# Patient Record
Sex: FEMALE | Race: WHITE | Employment: FULL TIME | URBAN - METROPOLITAN AREA
[De-identification: names, ages, dates, MRNs, and addresses within clinical notes are randomized per-mention and may not be internally consistent; named-entity substitution may affect disease eponyms.]

---
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
## 2023-02-16 ENCOUNTER — HOSPITAL ENCOUNTER (OUTPATIENT)
Dept: ULTRASOUND IMAGING | Age: 23
Discharge: HOME | End: 2023-02-16

## 2023-02-16 VITALS
HEIGHT: 61 IN | TEMPERATURE: 97.6 F | OXYGEN SATURATION: 98 % | DIASTOLIC BLOOD PRESSURE: 59 MMHG | HEART RATE: 74 BPM | BODY MASS INDEX: 49.65 KG/M2 | SYSTOLIC BLOOD PRESSURE: 114 MMHG | WEIGHT: 263 LBS | RESPIRATION RATE: 16 BRPM

## 2023-02-16 DIAGNOSIS — Z36.89 ENCOUNTER FOR FETAL ANATOMIC SURVEY: ICD-10-CM

## 2023-02-16 PROCEDURE — 76811 OB US DETAILED SNGL FETUS: CPT

## 2023-02-16 PROCEDURE — 99211 OFF/OP EST MAY X REQ PHY/QHP: CPT

## 2023-02-16 NOTE — PROCEDURES
[facility]/Women's Imaging Ultrasound Report    Patient name: Pam Canales Age: 25 y.o. : 2000     Date of Service: [id number]    BRIGIDA: Estimated Date of Delivery:2023 Gestational Age: 29  OB Hx: G1    Indication: Anatomy for BMI 49    Scan type: Anatomy/Level 2    Fetus #: 1     Presentation: Breech  Fetal Heart Rate: 165bpm   Placental location: Posterior  Amniotic fluid: Normal   P. Cord Insertion: Non-Visualized  Single Deepest Pocket: 5.81cm    Biometry:  Biparietal Diameter    78.5mm 31w4d  14%  Head Circumference   111.5mm 33w3d  32%  Abdominal Circumference  310.8mm 35w0d  >97%  Femur Length    61.4mm 31w6d  20%  Humerus Length   59.0mm 34w1d  Cerebellum    40.6mm 32w5d  Cisterna Magna   7.8mm  Nuchal Fold Thickness  N/A  Lateral Ventricle   2.2mm  Ocular Diameter (OOD)          40.6mm 32w5d  Ulna     56.2mm 35w2d  Radius     51.4mm  Tibia     55.9mm 32w6d  Fibula      55.6mm 36w4d  94%  Foot     Not performed    Fetal weight:    2244g  74%    Anatomy:      Head  Cranium:  Visualized  Intracranial Lena: Visualized  Fetal Cavum:  Visualized  Lateral Ventricles:  Visualized  Choroid Plexus:  Non-Visualized  Cerebellum:   Limited  Midline Falx:   Visualized  PMT/AR:   Non-Visualized  Midface:   Visualized  Profile:   Visualized  Orbits/Eyes:  Lenses non-visualized      Thorax  4 Chamber Heart: Limited  RVOT:   Non-Visualized  LVOT:    Visualized  3VV:    Visualized  3VTV:    Non-Visualized  Aortic Arch:    Non-Visualized  Ductal Arch:    Non-Visualized  SVC/IVC:    Non-Visualized  Crossing:  Non-Visualized  Cardiac Rhythm: Normal  Cardiac Axis:   Normal  Cardiac Situs:   Normal        Spine  Cervical  Visualized  Thoracic  Visualized  Lumbar  Visualized  Sacral   Visualized        Abdomen  Diaphragm  Visualized  Stomach   Visualized  Bowel   Visualized  Bladder   Visualized  Abd. Cord Insertion  Visualized  Ventral Wall  Visualized  Rt. Kidney  Visualized  Lt. Kidney  Visualized  Renal Arteries  Visualized  Umbilical Cord  3VC        Extremities  Humerus - Left Visualized  Humerus - Right Non-Visualized  Forearm - Left             Visualized  Forearm - Right  Non-Visualized  Hand - Left  Closed  Hand - Right  Closed  Femur - Left  Visualized  Femur - Right  Limited  Lower Leg - Left Visualized  Lower Leg - Right Limited  Feet   Limited    Genitalia:    Female          Other  BPP is    Not performed  UA Doppler   Not performed  MCA Doppler   Not performed        Thank you for sending your patient for an detailed anatomy scan. She is seen for maternal obesity. Biometry and fluid are reassuring. Anatomy is unremarkable but very limited. The limitations of US in the detection of fetal abnormalities and aneuploidy were discussed. Today's exam was limited by maternal habitus, fetal position and late gestational age.  US is not meant to take the place of the  exam after delivery. No f/u scheduled with SHANA.      MD SHANA Strickland

## 2023-02-16 NOTE — FLOWSHEET NOTE
Pt arrives to unit for scheduled US with MFM. Pt shown to LT-06. VS obtained. History and medications reviewed. Pt states feeling good fetal movement. Denies vaginal bleeding, leaking of fluid, and abdominal tenderness. Pt has no complaints at this time.

## 2023-03-06 ENCOUNTER — HOSPITAL ENCOUNTER (OUTPATIENT)
Age: 23
Discharge: HOME | End: 2023-03-06
Attending: OBSTETRICS & GYNECOLOGY | Admitting: OBSTETRICS & GYNECOLOGY
Payer: MEDICAID

## 2023-03-06 VITALS
HEIGHT: 61 IN | HEART RATE: 74 BPM | OXYGEN SATURATION: 98 % | TEMPERATURE: 97.9 F | DIASTOLIC BLOOD PRESSURE: 66 MMHG | BODY MASS INDEX: 51.54 KG/M2 | SYSTOLIC BLOOD PRESSURE: 124 MMHG | WEIGHT: 273 LBS | RESPIRATION RATE: 18 BRPM

## 2023-03-06 PROCEDURE — 59025 FETAL NON-STRESS TEST: CPT

## 2023-03-06 RX ORDER — ONDANSETRON 2 MG/ML
4 INJECTION INTRAMUSCULAR; INTRAVENOUS EVERY 6 HOURS PRN
Status: DISCONTINUED | OUTPATIENT
Start: 2023-03-06 | End: 2023-03-06 | Stop reason: HOSPADM

## 2023-03-06 RX ORDER — ACETAMINOPHEN 325 MG/1
650 TABLET ORAL EVERY 4 HOURS PRN
Status: DISCONTINUED | OUTPATIENT
Start: 2023-03-06 | End: 2023-03-06 | Stop reason: HOSPADM

## 2023-03-06 RX ORDER — ONDANSETRON 4 MG/1
4 TABLET, ORALLY DISINTEGRATING ORAL EVERY 8 HOURS PRN
Status: DISCONTINUED | OUTPATIENT
Start: 2023-03-06 | End: 2023-03-06 | Stop reason: HOSPADM

## 2023-03-06 NOTE — DISCHARGE INSTRUCTIONS
LABOR    Call your doctor if you have these signs:     Regular tightening of the uterus coming as often as once every 15 minutes     Menstrual-like cramps, they may be regular, or may be continuous and move to   your back. A low, dull backache different from what you normally experience. It may come and go. Vaginal leaking of fluid. Any bleeding from your vagina. Pain, burning or bleeding when you urinate. LABOR    Call your doctor, or come to the hospital, if you have:    Contractions coming about every 3-5 minutes apart, for about one hour. Labor contractions are usually strong enough that you can not walk or talk while having the contractions. ( Contractions can feel like menstrual cramps, tightening in your abdomen, rectal pressure or a backache. This feeling comes and goes.)    Vaginal leaking of fluid. Heavy, bright red bleeding, like the days of your period. ( A little bloody show or spotting is normal in labor.)    ALWAYS CALL YOUR DOCTOR IF YOU:    Notice decreased movement of your baby, different from what you are used to. Have heavy, bright red bleeding, like the heavy days of your periods. Have vaginal leaking of fluid.     Keep your next appointment. ______As scheduled_______________________________________    Activity ___As tolerated______________      Diet___As tolerated_________________

## 2023-03-06 NOTE — PROGRESS NOTES
Department of Obstetrics and Gynecology  Labor and Delivery  TRIAGE NOTE      SUBJECTIVE:    Chief Complaint   Patient presents with    Non-stress Test     Pt reports to triage for NST. Pt states that she was told to come in last week but did not have a ride. +FM. Pt denies VB, LOF. Pt states that baby is breech and she gets U/S every Thursday. OBJECTIVE    Vitals:  /64   Pulse 75   Temp 97.9 °F (36.6 °C) (Oral)   Resp 18   Ht 5' 1\" (1.549 m)   Wt 273 lb (123.8 kg)   SpO2 96%   BMI 51.58 kg/m²       CONSTITUTIONAL:  negative  RESPIRATORY:  negative  CARDIOVASCULAR:  negative  GASTROINTESTINAL:  negative  ALLERGIC/IMMUNOLOGIC:  negative  NEUROLOGICAL:  negative  BEHAVIOR/PSYCH:  negative    Cervix:  deferred    Fetal Position:    Breech    Fetal heart rate:        Baseline FHR :    130 bpm              Fetal Accelerations:  present        Fetal Decelerations:  absent        Fetal Variability:   moderate    Contraction frequency: quiet     DATA:  No results found for: WBC, HGB, HCT, MCV, PLT      ASSESSMENT:   NST for fetal well-being, third trimester      PLAN: Obtain reactive NST.         ALICIA Simpson - SEAMUS

## 2023-03-06 NOTE — FLOWSHEET NOTE
Discharge instructions given and signed voiced understanding. Left ambulatory from the [facility] in stable condition with friend.

## 2023-03-06 NOTE — FLOWSHEET NOTE
Pt states she talked to the office last week and was told to come to L/D for NST, pt unsure why she needs NST. Pt states she does have weekly U/S. Baby is breech. Pt c/o headache since Saturday off/on, behind eyes and back of head off / on, has not taken any tylenol and does not want any Tylenol. Pt denies any visual change or seeing spots or abdominal tenderness when touched. Pt denies any vaginal bleeding, denies LOF.

## 2023-03-30 ENCOUNTER — HOSPITAL ENCOUNTER (OUTPATIENT)
Age: 23
Discharge: HOME | End: 2023-03-30
Attending: OBSTETRICS & GYNECOLOGY | Admitting: OBSTETRICS & GYNECOLOGY
Payer: MEDICAID

## 2023-03-30 VITALS
WEIGHT: 284 LBS | RESPIRATION RATE: 16 BRPM | OXYGEN SATURATION: 97 % | TEMPERATURE: 97.8 F | HEIGHT: 61 IN | HEART RATE: 71 BPM | SYSTOLIC BLOOD PRESSURE: 116 MMHG | DIASTOLIC BLOOD PRESSURE: 56 MMHG | BODY MASS INDEX: 53.62 KG/M2

## 2023-03-30 PROCEDURE — 59025 FETAL NON-STRESS TEST: CPT

## 2023-03-30 PROCEDURE — 99213 OFFICE O/P EST LOW 20 MIN: CPT | Performed by: ADVANCED PRACTICE MIDWIFE

## 2023-03-30 RX ORDER — ACETAMINOPHEN 325 MG/1
650 TABLET ORAL EVERY 4 HOURS PRN
Status: DISCONTINUED | OUTPATIENT
Start: 2023-03-30 | End: 2023-03-30 | Stop reason: HOSPADM

## 2023-03-30 RX ORDER — ONDANSETRON 4 MG/1
4 TABLET, ORALLY DISINTEGRATING ORAL EVERY 8 HOURS PRN
Status: DISCONTINUED | OUTPATIENT
Start: 2023-03-30 | End: 2023-03-30 | Stop reason: HOSPADM

## 2023-03-30 RX ORDER — ONDANSETRON 2 MG/ML
4 INJECTION INTRAMUSCULAR; INTRAVENOUS EVERY 6 HOURS PRN
Status: DISCONTINUED | OUTPATIENT
Start: 2023-03-30 | End: 2023-03-30 | Stop reason: HOSPADM

## 2023-03-30 NOTE — DISCHARGE INSTRUCTIONS
OB DISCHARGE INSTRUCTIONS  3/30/2023 @ 1:08 PM     Discharge Disposition: *** Home *** May take Tylenol for pain. Labor  Call your doctor; or come to the hospital, if you have:  YES Contractions coming about every 5 minutes, for about one hour. Labor contractions are usually strong enough that you cannot walk or talk while having contractions. (Contractions can feel like menstrual cramps, tightening in your abdomen, rectal pressure or a backache. This feeling comes and goes.)   YES Vaginal leaking of fluid. YES Heavy, bright red bleeding, like the heavy days of your period.  (A little bloody show is normal in labor.)     Always call your Doctor if you:  YES Notice decreased movement of your baby, different from what you are used to. YES Have heavy, bright red bleeding, like the heavy days of your period. YES Have vaginal leaking of watery fluid. Your next appointment:___Keep your next appointment as scheduled and return to L & D if needed. _________________    Activity:  AS TOLERATED  Diet:  REGULAR  If you have any questions regarding your discharge instructions call us at [phone]  .

## 2023-03-30 NOTE — FLOWSHEET NOTE
Patient presents to the [facility] ambulatory, sent by the doctor. Shown to room lt-02, oriented to room.

## 2023-03-30 NOTE — PROGRESS NOTES
Department of Obstetrics and Gynecology  Labor and Delivery  TRIAGE NOTE      SUBJECTIVE:    Chief Complaint   Patient presents with    Non-stress Test     PT sent to triage for NST due to BPP of 6/8. +FM. PT denies VB, LOF. OBJECTIVE    Vitals:  BP (!) 140/63   Pulse 70   Temp 97.8 °F (36.6 °C) (Oral)   Resp 16   Ht 5' 1\" (1.549 m)   Wt 284 lb (128.8 kg)   SpO2 97%   BMI 53.66 kg/m²     Repeat /60      CONSTITUTIONAL:  negative  RESPIRATORY:  negative  CARDIOVASCULAR:  negative  GASTROINTESTINAL:  negative  ALLERGIC/IMMUNOLOGIC:  negative  NEUROLOGICAL:  negative  BEHAVIOR/PSYCH:  negative    Cervix:  deferred     Fetal Position:    Cephalic    Membranes:    Intact    Fetal heart rate:        Baseline FHR :    140 bpm              Fetal Accelerations:  present        Fetal Decelerations:  absent        Fetal Variability:   moderate    Contraction frequency: 6-7 minutes     Reactive NST    DATA:  No results found for: WBC, HGB, HCT, MCV, PLT      ASSESSMENT:   Reactive NST, third trimester pregnancy, high risk pregnancy due to high BMI       PLAN: Pt to be discharged home with labor precautions. PT to follow up in the office.          ALICIA Burrell - SEAMUS

## 2023-04-03 ENCOUNTER — HOSPITAL ENCOUNTER (INPATIENT)
Age: 23
LOS: 4 days | Discharge: HOME | End: 2023-04-07
Attending: OBSTETRICS & GYNECOLOGY | Admitting: OBSTETRICS & GYNECOLOGY
Payer: MEDICAID

## 2023-04-03 LAB
ABO/RH: NORMAL
ALBUMIN SERPL-MCNC: 3.2 GM/DL (ref 3.4–5)
ALP BLD-CCNC: 137 IU/L (ref 40–129)
ALT SERPL-CCNC: 8 U/L (ref 10–40)
AMPHETAMINES: NEGATIVE
ANION GAP SERPL CALCULATED.3IONS-SCNC: 14 MMOL/L (ref 4–16)
ANTIBODY SCREEN: NEGATIVE
AST SERPL-CCNC: 11 IU/L (ref 15–37)
BARBITURATE SCREEN URINE: NEGATIVE
BASOPHILS ABSOLUTE: 0 K/CU MM
BASOPHILS RELATIVE PERCENT: 0.3 % (ref 0–1)
BENZODIAZEPINE SCREEN, URINE: NEGATIVE
BILIRUB SERPL-MCNC: 0.1 MG/DL (ref 0–1)
BUN SERPL-MCNC: 12 MG/DL (ref 6–23)
CALCIUM SERPL-MCNC: 9.2 MG/DL (ref 8.3–10.6)
CANNABINOID SCREEN URINE: NEGATIVE
CHLORIDE BLD-SCNC: 106 MMOL/L (ref 99–110)
CO2: 19 MMOL/L (ref 21–32)
COCAINE METABOLITE: NEGATIVE
CREAT SERPL-MCNC: 0.6 MG/DL (ref 0.6–1.1)
CREATININE URINE: 291.6 MG/DL (ref 28–217)
DIFFERENTIAL TYPE: ABNORMAL
EOSINOPHILS ABSOLUTE: 0.1 K/CU MM
EOSINOPHILS RELATIVE PERCENT: 0.6 % (ref 0–3)
GFR SERPL CREATININE-BSD FRML MDRD: >60 ML/MIN/1.73M2
GLUCOSE SERPL-MCNC: 104 MG/DL (ref 70–99)
HCT VFR BLD CALC: 37.3 % (ref 37–47)
HEMOGLOBIN: 12.5 GM/DL (ref 12.5–16)
IMMATURE NEUTROPHIL %: 0.3 % (ref 0–0.43)
LYMPHOCYTES ABSOLUTE: 2.5 K/CU MM
LYMPHOCYTES RELATIVE PERCENT: 26.2 % (ref 24–44)
MCH RBC QN AUTO: 27.8 PG (ref 27–31)
MCHC RBC AUTO-ENTMCNC: 33.5 % (ref 32–36)
MCV RBC AUTO: 82.9 FL (ref 78–100)
MONOCYTES ABSOLUTE: 0.8 K/CU MM
MONOCYTES RELATIVE PERCENT: 7.9 % (ref 0–4)
NUCLEATED RBC %: 0 %
OPIATES, URINE: NEGATIVE
OXYCODONE: NEGATIVE
PDW BLD-RTO: 14.6 % (ref 11.7–14.9)
PHENCYCLIDINE, URINE: NEGATIVE
PLATELET # BLD: 263 K/CU MM (ref 140–440)
PMV BLD AUTO: 11.6 FL (ref 7.5–11.1)
POTASSIUM SERPL-SCNC: 4.3 MMOL/L (ref 3.5–5.1)
PROT/CREAT RATIO, UR: 0.7
RBC # BLD: 4.5 M/CU MM (ref 4.2–5.4)
SEGMENTED NEUTROPHILS ABSOLUTE COUNT: 6.1 K/CU MM
SEGMENTED NEUTROPHILS RELATIVE PERCENT: 64.7 % (ref 36–66)
SODIUM BLD-SCNC: 139 MMOL/L (ref 135–145)
TOTAL IMMATURE NEUTOROPHIL: 0.03 K/CU MM
TOTAL NUCLEATED RBC: 0 K/CU MM
TOTAL PROTEIN: 5.9 GM/DL (ref 6.4–8.2)
URATE SERPL-MCNC: 7.2 MG/DL (ref 2.6–6)
URINE TOTAL PROTEIN: 199.9 MG/DL
WBC # BLD: 9.5 K/CU MM (ref 4–10.5)

## 2023-04-03 PROCEDURE — 82570 ASSAY OF URINE CREATININE: CPT

## 2023-04-03 PROCEDURE — 86850 RBC ANTIBODY SCREEN: CPT

## 2023-04-03 PROCEDURE — 2580000003 HC RX 258: Performed by: ADVANCED PRACTICE MIDWIFE

## 2023-04-03 PROCEDURE — 85025 COMPLETE CBC W/AUTO DIFF WBC: CPT

## 2023-04-03 PROCEDURE — 86901 BLOOD TYPING SEROLOGIC RH(D): CPT

## 2023-04-03 PROCEDURE — 80307 DRUG TEST PRSMV CHEM ANLYZR: CPT

## 2023-04-03 PROCEDURE — 6370000000 HC RX 637 (ALT 250 FOR IP): Performed by: ADVANCED PRACTICE MIDWIFE

## 2023-04-03 PROCEDURE — 80053 COMPREHEN METABOLIC PANEL: CPT

## 2023-04-03 PROCEDURE — 1220000000 HC SEMI PRIVATE OB R&B

## 2023-04-03 PROCEDURE — 84156 ASSAY OF PROTEIN URINE: CPT

## 2023-04-03 PROCEDURE — 84550 ASSAY OF BLOOD/URIC ACID: CPT

## 2023-04-03 PROCEDURE — 86900 BLOOD TYPING SEROLOGIC ABO: CPT

## 2023-04-03 RX ORDER — DOCUSATE SODIUM 100 MG/1
100 CAPSULE, LIQUID FILLED ORAL 2 TIMES DAILY
Status: DISCONTINUED | OUTPATIENT
Start: 2023-04-03 | End: 2023-04-06

## 2023-04-03 RX ORDER — CARBOPROST TROMETHAMINE 250 UG/ML
250 INJECTION, SOLUTION INTRAMUSCULAR PRN
Status: DISCONTINUED | OUTPATIENT
Start: 2023-04-03 | End: 2023-04-06

## 2023-04-03 RX ORDER — SODIUM CHLORIDE 0.9 % (FLUSH) 0.9 %
5-40 SYRINGE (ML) INJECTION PRN
Status: DISCONTINUED | OUTPATIENT
Start: 2023-04-03 | End: 2023-04-06

## 2023-04-03 RX ORDER — FENTANYL CITRATE 50 UG/ML
100 INJECTION, SOLUTION INTRAMUSCULAR; INTRAVENOUS
Status: DISCONTINUED | OUTPATIENT
Start: 2023-04-03 | End: 2023-04-06

## 2023-04-03 RX ORDER — SODIUM CHLORIDE 9 MG/ML
25 INJECTION, SOLUTION INTRAVENOUS PRN
Status: DISCONTINUED | OUTPATIENT
Start: 2023-04-03 | End: 2023-04-06

## 2023-04-03 RX ORDER — TRANEXAMIC ACID 10 MG/ML
1000 INJECTION, SOLUTION INTRAVENOUS
Status: ACTIVE | OUTPATIENT
Start: 2023-04-03 | End: 2023-04-04

## 2023-04-03 RX ORDER — SODIUM CHLORIDE 0.9 % (FLUSH) 0.9 %
5-40 SYRINGE (ML) INJECTION EVERY 12 HOURS SCHEDULED
Status: DISCONTINUED | OUTPATIENT
Start: 2023-04-03 | End: 2023-04-06

## 2023-04-03 RX ORDER — SODIUM CHLORIDE, SODIUM LACTATE, POTASSIUM CHLORIDE, AND CALCIUM CHLORIDE .6; .31; .03; .02 G/100ML; G/100ML; G/100ML; G/100ML
500 INJECTION, SOLUTION INTRAVENOUS PRN
Status: DISCONTINUED | OUTPATIENT
Start: 2023-04-03 | End: 2023-04-06

## 2023-04-03 RX ORDER — MISOPROSTOL 200 UG/1
800 TABLET ORAL PRN
Status: DISCONTINUED | OUTPATIENT
Start: 2023-04-03 | End: 2023-04-06

## 2023-04-03 RX ORDER — SODIUM CHLORIDE, SODIUM LACTATE, POTASSIUM CHLORIDE, CALCIUM CHLORIDE 600; 310; 30; 20 MG/100ML; MG/100ML; MG/100ML; MG/100ML
INJECTION, SOLUTION INTRAVENOUS CONTINUOUS
Status: DISCONTINUED | OUTPATIENT
Start: 2023-04-03 | End: 2023-04-06

## 2023-04-03 RX ORDER — ACETAMINOPHEN 500 MG
500 TABLET ORAL EVERY 6 HOURS PRN
COMMUNITY

## 2023-04-03 RX ORDER — ONDANSETRON 2 MG/ML
4 INJECTION INTRAMUSCULAR; INTRAVENOUS EVERY 6 HOURS PRN
Status: DISCONTINUED | OUTPATIENT
Start: 2023-04-03 | End: 2023-04-06

## 2023-04-03 RX ORDER — SODIUM CHLORIDE, SODIUM LACTATE, POTASSIUM CHLORIDE, AND CALCIUM CHLORIDE .6; .31; .03; .02 G/100ML; G/100ML; G/100ML; G/100ML
1000 INJECTION, SOLUTION INTRAVENOUS PRN
Status: DISCONTINUED | OUTPATIENT
Start: 2023-04-03 | End: 2023-04-06

## 2023-04-03 RX ORDER — METHYLERGONOVINE MALEATE 0.2 MG/ML
200 INJECTION INTRAVENOUS PRN
Status: DISCONTINUED | OUTPATIENT
Start: 2023-04-03 | End: 2023-04-06

## 2023-04-03 RX ADMIN — Medication 25 MCG: at 18:48

## 2023-04-03 RX ADMIN — SODIUM CHLORIDE, POTASSIUM CHLORIDE, SODIUM LACTATE AND CALCIUM CHLORIDE: 600; 310; 30; 20 INJECTION, SOLUTION INTRAVENOUS at 17:26

## 2023-04-03 RX ADMIN — Medication 25 MCG: at 23:21

## 2023-04-03 NOTE — H&P
file.  Medications Prior to Admission:  Medications Prior to Admission: Prenatal w/o A Vit-Fe Fum-FA (PRENATA PO), Take by mouth daily    REVIEW OF SYSTEMS:    CONSTITUTIONAL:  negative  RESPIRATORY:  negative  CARDIOVASCULAR:  negative  GASTROINTESTINAL:  negative  ALLERGIC/IMMUNOLOGIC:  negative  GI/: negative  NEUROLOGICAL:  negative  BEHAVIOR/PSYCH:  negative    PHYSICAL EXAM:  There were no vitals taken for this visit. No results found for: WBC, HGB, HCT, MCV, PLT    Blood Type: A+   Rubella: immune  HIV: neg  Hep B: neg        General appearance:  awake, alert, cooperative, no apparent distress, and appears stated age  Neurologic:  Awake, alert, oriented to name, place and time. Lungs:  CTAB, no SOB and dyspnea   Abdomen:  Soft, non tender, gravid  Fetal heart rate:           Baseline:  135        Accelerations:  present       Long Term Variability:  moderate       Decelerations:  absent       Pelvis:  Adequate pelvis  Cervix: Closed       Contraction frequency:  quiet    Membranes:  Intact    ASSESSMENT:    25 y.o. female at 36w3d, Estimated Date of Delivery: 4/9/23  GBS:negative  FHR Cat: 1    PLAN:  Admit, anticipate normal delivery, routine labor orders  Other: plan cytotec for cervical ripening and labor induction      I have collaborated and updated Dr. Florentina Cockayne and the MD agrees with the current POC.       ALICIA Parra CNM

## 2023-04-03 NOTE — FLOWSHEET NOTE
Patient presents to the [facility] ambulatory for a pih workup and a cytotec induction, sent over by Dr. Lyric Salazar. Shown to room ld-04, oriented to room, instructed on ccms u/a and to change into gown.

## 2023-04-04 ENCOUNTER — ANESTHESIA EVENT (OUTPATIENT)
Dept: LABOR AND DELIVERY | Age: 23
End: 2023-04-04
Payer: MEDICAID

## 2023-04-04 ENCOUNTER — ANESTHESIA (OUTPATIENT)
Dept: LABOR AND DELIVERY | Age: 23
End: 2023-04-04
Payer: MEDICAID

## 2023-04-04 PROCEDURE — 6360000002 HC RX W HCPCS: Performed by: NURSE ANESTHETIST, CERTIFIED REGISTERED

## 2023-04-04 PROCEDURE — 3700000025 EPIDURAL BLOCK: Performed by: ANESTHESIOLOGY

## 2023-04-04 PROCEDURE — 6360000002 HC RX W HCPCS: Performed by: ADVANCED PRACTICE MIDWIFE

## 2023-04-04 PROCEDURE — 6370000000 HC RX 637 (ALT 250 FOR IP): Performed by: OBSTETRICS & GYNECOLOGY

## 2023-04-04 PROCEDURE — 2500000003 HC RX 250 WO HCPCS: Performed by: NURSE ANESTHETIST, CERTIFIED REGISTERED

## 2023-04-04 PROCEDURE — 2580000003 HC RX 258: Performed by: ADVANCED PRACTICE MIDWIFE

## 2023-04-04 PROCEDURE — 1220000000 HC SEMI PRIVATE OB R&B

## 2023-04-04 RX ORDER — ROPIVACAINE HYDROCHLORIDE 2 MG/ML
INJECTION, SOLUTION EPIDURAL; INFILTRATION; PERINEURAL PRN
Status: DISCONTINUED | OUTPATIENT
Start: 2023-04-04 | End: 2023-04-05 | Stop reason: SDUPTHER

## 2023-04-04 RX ORDER — ACETAMINOPHEN 500 MG
1000 TABLET ORAL EVERY 6 HOURS PRN
Status: DISCONTINUED | OUTPATIENT
Start: 2023-04-04 | End: 2023-04-06

## 2023-04-04 RX ORDER — NALOXONE HYDROCHLORIDE 0.4 MG/ML
INJECTION, SOLUTION INTRAMUSCULAR; INTRAVENOUS; SUBCUTANEOUS PRN
Status: DISCONTINUED | OUTPATIENT
Start: 2023-04-04 | End: 2023-04-06

## 2023-04-04 RX ORDER — ROPIVACAINE HYDROCHLORIDE 2 MG/ML
10 INJECTION, SOLUTION EPIDURAL; INFILTRATION; PERINEURAL CONTINUOUS
Status: DISCONTINUED | OUTPATIENT
Start: 2023-04-04 | End: 2023-04-06

## 2023-04-04 RX ORDER — LIDOCAINE HYDROCHLORIDE AND EPINEPHRINE 20; 5 MG/ML; UG/ML
INJECTION, SOLUTION EPIDURAL; INFILTRATION; INTRACAUDAL; PERINEURAL PRN
Status: DISCONTINUED | OUTPATIENT
Start: 2023-04-04 | End: 2023-04-05 | Stop reason: SDUPTHER

## 2023-04-04 RX ADMIN — SODIUM CHLORIDE, POTASSIUM CHLORIDE, SODIUM LACTATE AND CALCIUM CHLORIDE: 600; 310; 30; 20 INJECTION, SOLUTION INTRAVENOUS at 23:11

## 2023-04-04 RX ADMIN — SODIUM CHLORIDE, POTASSIUM CHLORIDE, SODIUM LACTATE AND CALCIUM CHLORIDE: 600; 310; 30; 20 INJECTION, SOLUTION INTRAVENOUS at 01:35

## 2023-04-04 RX ADMIN — ROPIVACAINE HYDROCHLORIDE 7 ML: 2 INJECTION, SOLUTION EPIDURAL; INFILTRATION; PERINEURAL at 21:43

## 2023-04-04 RX ADMIN — SODIUM CHLORIDE, POTASSIUM CHLORIDE, SODIUM LACTATE AND CALCIUM CHLORIDE: 600; 310; 30; 20 INJECTION, SOLUTION INTRAVENOUS at 20:44

## 2023-04-04 RX ADMIN — Medication 1 MILLI-UNITS/MIN: at 18:42

## 2023-04-04 RX ADMIN — ROPIVACAINE HYDROCHLORIDE 10 ML/HR: 2 INJECTION, SOLUTION EPIDURAL; INFILTRATION at 21:44

## 2023-04-04 RX ADMIN — ACETAMINOPHEN 1000 MG: 500 TABLET ORAL at 02:16

## 2023-04-04 RX ADMIN — LIDOCAINE HYDROCHLORIDE AND EPINEPHRINE 5 ML: 20; 5 INJECTION, SOLUTION EPIDURAL; INFILTRATION; INTRACAUDAL; PERINEURAL at 21:42

## 2023-04-04 RX ADMIN — ACETAMINOPHEN 1000 MG: 500 TABLET ORAL at 09:53

## 2023-04-04 ASSESSMENT — PAIN DESCRIPTION - DESCRIPTORS
DESCRIPTORS: CRAMPING
DESCRIPTORS: CRAMPING
DESCRIPTORS: ACHING
DESCRIPTORS: CRAMPING

## 2023-04-04 ASSESSMENT — PAIN DESCRIPTION - ORIENTATION
ORIENTATION: MID;LOWER
ORIENTATION: LOWER

## 2023-04-04 ASSESSMENT — PAIN DESCRIPTION - LOCATION
LOCATION: ABDOMEN
LOCATION: ABDOMEN

## 2023-04-04 ASSESSMENT — PAIN SCALES - GENERAL
PAINLEVEL_OUTOF10: 4
PAINLEVEL_OUTOF10: 6

## 2023-04-04 ASSESSMENT — PAIN - FUNCTIONAL ASSESSMENT: PAIN_FUNCTIONAL_ASSESSMENT: ACTIVITIES ARE NOT PREVENTED

## 2023-04-04 NOTE — PROGRESS NOTES
Department of Obstetrics and Gynecology  Labor and Delivery   Midwifery Progress Note      SUBJECTIVE:  AROM    OBJECTIVE:      Fetal heart rate:       Baseline Heart Rate:  135        Accelerations:  present       Variability:  moderate       Decelerations:  absent         Contraction frequency: 2 minutes- 4 minutes    Membranes:  Ruptured clear fluid    Cervix:         Dilation:  5 cm-6cm         Effacement:  75%         Station:  -3         Position:  posterior             ASSESSMENT     AROM clear fluid  PLAN:    Continue routine induction orders. I have collaborated and updated Dr Jodi Frances and the MD agrees with the current POC.

## 2023-04-04 NOTE — PROGRESS NOTES
Department of Obstetrics and Gynecology  Labor and Delivery   Midwifery Progress Note      SUBJECTIVE:  King insertion for cervical ripening    OBJECTIVE:      Fetal heart rate:       Baseline Heart Rate:  135        Accelerations:  present       Variability:  moderate       Decelerations:  absent         Contraction frequency: 2 minutes- 5 minutes    Membranes:  Intact    Cervix:         Dilation:  1 cm         Effacement:  Long         Station:  -3         Position:  mid             ASSESSMENT   King placed successfully    PLAN:    Continued IOL orders. I have collaborated and updated Dr Tita Redd and the MD agrees with the current POC.

## 2023-04-05 PROBLEM — Z3A.39 39 WEEKS GESTATION OF PREGNANCY: Status: ACTIVE | Noted: 2023-04-05

## 2023-04-05 PROCEDURE — 51702 INSERT TEMP BLADDER CATH: CPT

## 2023-04-05 PROCEDURE — 6360000002 HC RX W HCPCS: Performed by: NURSE ANESTHETIST, CERTIFIED REGISTERED

## 2023-04-05 PROCEDURE — 7200000001 HC VAGINAL DELIVERY

## 2023-04-05 PROCEDURE — 6370000000 HC RX 637 (ALT 250 FOR IP)

## 2023-04-05 PROCEDURE — 2580000003 HC RX 258: Performed by: ADVANCED PRACTICE MIDWIFE

## 2023-04-05 PROCEDURE — 6370000000 HC RX 637 (ALT 250 FOR IP): Performed by: OBSTETRICS & GYNECOLOGY

## 2023-04-05 PROCEDURE — 6360000002 HC RX W HCPCS: Performed by: ADVANCED PRACTICE MIDWIFE

## 2023-04-05 PROCEDURE — 1220000000 HC SEMI PRIVATE OB R&B

## 2023-04-05 PROCEDURE — 2500000003 HC RX 250 WO HCPCS: Performed by: NURSE ANESTHETIST, CERTIFIED REGISTERED

## 2023-04-05 PROCEDURE — 59409 OBSTETRICAL CARE: CPT | Performed by: OBSTETRICS & GYNECOLOGY

## 2023-04-05 RX ORDER — CALCIUM CARBONATE 200(500)MG
1000 TABLET,CHEWABLE ORAL 3 TIMES DAILY PRN
Status: DISCONTINUED | OUTPATIENT
Start: 2023-04-05 | End: 2023-04-06

## 2023-04-05 RX ORDER — FENTANYL CITRATE 50 UG/ML
INJECTION, SOLUTION INTRAMUSCULAR; INTRAVENOUS PRN
Status: DISCONTINUED | OUTPATIENT
Start: 2023-04-05 | End: 2023-04-05 | Stop reason: SDUPTHER

## 2023-04-05 RX ORDER — BUPIVACAINE HYDROCHLORIDE 5 MG/ML
INJECTION, SOLUTION EPIDURAL; INTRACAUDAL PRN
Status: DISCONTINUED | OUTPATIENT
Start: 2023-04-05 | End: 2023-04-05 | Stop reason: SDUPTHER

## 2023-04-05 RX ORDER — IBUPROFEN 800 MG/1
800 TABLET ORAL EVERY 8 HOURS
Status: DISCONTINUED | OUTPATIENT
Start: 2023-04-05 | End: 2023-04-07 | Stop reason: HOSPADM

## 2023-04-05 RX ADMIN — ACETAMINOPHEN 1000 MG: 500 TABLET ORAL at 03:08

## 2023-04-05 RX ADMIN — CALCIUM CARBONATE (ANTACID) CHEW TAB 500 MG 1000 MG: 500 CHEW TAB at 08:49

## 2023-04-05 RX ADMIN — BUPIVACAINE HYDROCHLORIDE 3 ML: 5 INJECTION, SOLUTION EPIDURAL; INTRACAUDAL; PERINEURAL at 00:48

## 2023-04-05 RX ADMIN — IBUPROFEN 800 MG: 800 TABLET, FILM COATED ORAL at 21:13

## 2023-04-05 RX ADMIN — ONDANSETRON 4 MG: 2 INJECTION INTRAMUSCULAR; INTRAVENOUS at 01:16

## 2023-04-05 RX ADMIN — SODIUM CHLORIDE, POTASSIUM CHLORIDE, SODIUM LACTATE AND CALCIUM CHLORIDE: 600; 310; 30; 20 INJECTION, SOLUTION INTRAVENOUS at 05:38

## 2023-04-05 RX ADMIN — FENTANYL CITRATE 100 MCG: 50 INJECTION, SOLUTION INTRAMUSCULAR; INTRAVENOUS at 00:48

## 2023-04-05 RX ADMIN — ROPIVACAINE HYDROCHLORIDE 10 ML/HR: 2 INJECTION, SOLUTION EPIDURAL; INFILTRATION at 14:57

## 2023-04-05 RX ADMIN — Medication 166.7 ML: at 19:55

## 2023-04-05 RX ADMIN — Medication 8 MILLI-UNITS/MIN: at 09:11

## 2023-04-05 ASSESSMENT — PAIN SCALES - GENERAL: PAINLEVEL_OUTOF10: 1

## 2023-04-05 ASSESSMENT — PAIN DESCRIPTION - DESCRIPTORS: DESCRIPTORS: PRESSURE

## 2023-04-05 NOTE — ANESTHESIA PROCEDURE NOTES
Epidural Block    Patient location during procedure: OB  Start time: 4/4/2023 9:32 PM  End time: 4/4/2023 9:43 PM  Reason for block: labor epidural  Staffing  Performed: resident/CRNA   Anesthesiologist: Robe Mojica MD  Resident/CRNA: ALICIA Rosado CRNA  Epidural  Patient position: sitting  Prep: ChloraPrep  Patient monitoring: continuous pulse ox and frequent blood pressure checks  Approach: midline  Location: L3-4  Injection technique: RENEE air  Provider prep: sterile gloves and mask  Needle  Needle type: Tuohy   Needle gauge: 17 G  Needle length: 3.5 in  Needle insertion depth: 6 cm  Catheter type: side hole  Catheter size: 19 G  Catheter at skin depth: 10 cm  Test dose: negativeCatheter Secured: tegaderm and tape  Assessment  Sensory level: T8  Hemodynamics: stable  Attempts: 1  Outcomes: uncomplicated and patient tolerated procedure well  Preanesthetic Checklist  Completed: patient identified, IV checked, site marked, risks and benefits discussed, surgical/procedural consents, equipment checked, pre-op evaluation, timeout performed, anesthesia consent given, oxygen available, monitors applied/VS acknowledged, fire risk safety assessment completed and verbalized and blood product R/B/A discussed and consented

## 2023-04-05 NOTE — ANESTHESIA PRE PROCEDURE
Rectal PRN Darus Toledo, APRN - CNM        oxytocin (PITOCIN) 30 units in 500 mL infusion  87.3 clarice-units/min IntraVENous Continuous PRN Darus Toledo, APRN - CNM        And    oxytocin (PITOCIN) 10 unit bolus from the bag  10 Units IntraVENous PRN Darus Toledo, APRN - CNM        fentaNYL (SUBLIMAZE) injection 100 mcg  100 mcg IntraVENous Q1H PRN Darus Toledo, APRN - CNM        ondansetron [facility]) injection 4 mg  4 mg IntraVENous Q6H PRN Darus Toledo, APRN - CNM        docusate sodium (COLACE) capsule 100 mg  100 mg Oral BID Darus Toledo, APRN - CNM           Allergies:  No Known Allergies    Problem List:    Patient Active Problem List   Diagnosis Code    Labor and delivery indication for care or intervention O75.9       Past Medical History:        Diagnosis Date    Asthma        Past Surgical History:  History reviewed. No pertinent surgical history. Social History:    Social History     Tobacco Use    Smoking status: Former     Types: Cigarettes    Smokeless tobacco: Never   Substance Use Topics    Alcohol use: Not Currently                                Counseling given: Not Answered      Vital Signs (Current):   Vitals:    04/04/23 1609 04/04/23 1842 04/04/23 1843 04/04/23 1916   BP: 139/71 137/60 137/60 135/71   Pulse: 67 75 75 65   Resp: 18  18 18   Temp: 37.2 °C (99 °F)  37.2 °C (99 °F) 37.1 °C (98.8 °F)   TempSrc: Oral  Oral Oral   SpO2: 97% 97% 97% 98%   Weight:       Height:                                                  BP Readings from Last 3 Encounters:   04/04/23 135/71   04/02/23 (!) 147/72   03/30/23 (!) 116/56       NPO Status:                                                                                 BMI:   Wt Readings from Last 3 Encounters:   04/03/23 284 lb (128.8 kg)   03/30/23 284 lb (128.8 kg)   03/06/23 273 lb (123.8 kg)     Body mass index is 53.66 kg/m².     CBC:   Lab Results   Component Value Date/Time    WBC 9.5 04/03/2023 04:45 PM    RBC 4.50

## 2023-04-05 NOTE — FLOWSHEET NOTE
TR to Dr. Binh Villa with patient recent SVE, Pitocin rate currently; EFM and [place] tracings with [employer] wireless monitors reviewed; said would be out to place FSE and IUPC monitors.

## 2023-04-05 NOTE — PROGRESS NOTES
Category 1 tracing. Cervix 10/complete/+1  Pelvis adequate  Efw 4200grams  Discussed findings.    Begin pushing

## 2023-04-05 NOTE — FLOWSHEET NOTE
Dr. Naliin Soria at bedside; [facility] fetal wireless monitors taken off and FSE and IUPC placed; 7cm.

## 2023-04-05 NOTE — FLOWSHEET NOTE
Yeny Villa CNM and RN at bedside preforming SVE and replaced IUPC. Pt positioned with big peanut ball in between legs on left side.

## 2023-04-05 NOTE — PROGRESS NOTES
Department of Obstetrics and Gynecology  Labor and Delivery   Midwifery Progress Note      SUBJECTIVE:  Pt coping well with contractions    OBJECTIVE:      Fetal heart rate:       Baseline Heart Rate:  135        Accelerations:  present       Variability:  moderate       Decelerations:  absent         Contraction frequency: 2 minutes    Membranes:  Ruptured clear fluid    Cervix:         Dilation:  5 cm         Effacement:  75%         Station:  -1- Caput         Position:  mid             ASSESSMENT   Pt coping well with contractions  FHT- reactive   Discussed stopping pitocin for a pitocin rest, replaced of IUPC and options for delivery once pitocin Is restarted. IUPC replaced after patients consent. SVE- for this cnm is 5-6 cm moderate amount of caput noted. PLAN:  Will stop pitocin and restart at half dose in 30 mins   Tums PO   Will Continue with POC   Anticipate active labor. I have collaborated and updated Dr Myesha Lewis and the MD agrees with the current POC.      ALICIA Barker - CNM

## 2023-04-05 NOTE — FLOWSHEET NOTE
Kailey Wright CNM and RN at bedside preforming a SVE and replacing IUPC. Pt gave consent and pt tolerated well. Pitocin stopped for rest verbal order by Kailey Wright CNM.

## 2023-04-05 NOTE — FLOWSHEET NOTE
Beba Shah, SEAMUS and RN at bedside preforming SVE. PT gave consent and tolerated well. PT educated and questions were answered.

## 2023-04-05 NOTE — PROGRESS NOTES
Department of Obstetrics and Gynecology  Labor and Delivery   Midwifery Progress Note      SUBJECTIVE:  Pt redosed and comfortable with Contractions. OBJECTIVE:      Fetal heart rate:       Baseline Heart Rate:  150        Accelerations:  present       Variability:  moderate       Decelerations:  absent         Contraction frequency: 2-5 minutes    Membranes:  Ruptured clear fluid    Cervix:         Dilation:  5-6 cm         Effacement:  75%         Station:  -1         Position:  mid             ASSESSMENT   Pt Comfortable with Epidural   SVE- unchanged. Contractions at this time are not adequate. Discussed continuing with induction vs . Pt at this time would like to continue with induction and resting in hopes for a vaginal delivery once contractions are adequate. Fht- reactive   Pitocin 14 mu/min  Orders given for frequent position change to encourage fetal rotation. PLAN:    Will Continue with Induction at this time  Anticipate active labor. I have collaborated and updated Dr Jeanette Hayward  and the MD agrees with the current POC.      ALICIA Zamorano CNM

## 2023-04-06 PROCEDURE — 6370000000 HC RX 637 (ALT 250 FOR IP): Performed by: OBSTETRICS & GYNECOLOGY

## 2023-04-06 PROCEDURE — 1220000000 HC SEMI PRIVATE OB R&B

## 2023-04-06 RX ORDER — FAMOTIDINE 20 MG/1
20 TABLET, FILM COATED ORAL 2 TIMES DAILY PRN
Status: DISCONTINUED | OUTPATIENT
Start: 2023-04-06 | End: 2023-04-07 | Stop reason: HOSPADM

## 2023-04-06 RX ORDER — SODIUM CHLORIDE 0.9 % (FLUSH) 0.9 %
5-40 SYRINGE (ML) INJECTION EVERY 12 HOURS SCHEDULED
Status: DISCONTINUED | OUTPATIENT
Start: 2023-04-06 | End: 2023-04-07 | Stop reason: HOSPADM

## 2023-04-06 RX ORDER — SODIUM CHLORIDE 9 MG/ML
INJECTION, SOLUTION INTRAVENOUS PRN
Status: DISCONTINUED | OUTPATIENT
Start: 2023-04-06 | End: 2023-04-07 | Stop reason: HOSPADM

## 2023-04-06 RX ORDER — OXYCODONE HYDROCHLORIDE 5 MG/1
5 TABLET ORAL EVERY 4 HOURS PRN
Status: DISCONTINUED | OUTPATIENT
Start: 2023-04-06 | End: 2023-04-07 | Stop reason: HOSPADM

## 2023-04-06 RX ORDER — LANOLIN 72 %
OINTMENT (GRAM) TOPICAL PRN
Status: DISCONTINUED | OUTPATIENT
Start: 2023-04-06 | End: 2023-04-07 | Stop reason: HOSPADM

## 2023-04-06 RX ORDER — NICOTINE 21 MG/24HR
1 PATCH, TRANSDERMAL 24 HOURS TRANSDERMAL DAILY
Status: DISCONTINUED | OUTPATIENT
Start: 2023-04-06 | End: 2023-04-07 | Stop reason: HOSPADM

## 2023-04-06 RX ORDER — METHYLERGONOVINE MALEATE 0.2 MG/ML
200 INJECTION INTRAVENOUS PRN
Status: DISCONTINUED | OUTPATIENT
Start: 2023-04-06 | End: 2023-04-07 | Stop reason: HOSPADM

## 2023-04-06 RX ORDER — DOCUSATE SODIUM 100 MG/1
100 CAPSULE, LIQUID FILLED ORAL 2 TIMES DAILY
Status: DISCONTINUED | OUTPATIENT
Start: 2023-04-06 | End: 2023-04-07 | Stop reason: HOSPADM

## 2023-04-06 RX ORDER — ONDANSETRON 4 MG/1
4 TABLET, ORALLY DISINTEGRATING ORAL EVERY 6 HOURS PRN
Status: DISCONTINUED | OUTPATIENT
Start: 2023-04-06 | End: 2023-04-07 | Stop reason: HOSPADM

## 2023-04-06 RX ORDER — SIMETHICONE 80 MG
80 TABLET,CHEWABLE ORAL EVERY 6 HOURS PRN
Status: DISCONTINUED | OUTPATIENT
Start: 2023-04-06 | End: 2023-04-07 | Stop reason: HOSPADM

## 2023-04-06 RX ORDER — CARBOPROST TROMETHAMINE 250 UG/ML
250 INJECTION, SOLUTION INTRAMUSCULAR PRN
Status: DISCONTINUED | OUTPATIENT
Start: 2023-04-06 | End: 2023-04-07 | Stop reason: HOSPADM

## 2023-04-06 RX ORDER — ACETAMINOPHEN 500 MG
1000 TABLET ORAL EVERY 8 HOURS
Status: DISCONTINUED | OUTPATIENT
Start: 2023-04-06 | End: 2023-04-07 | Stop reason: HOSPADM

## 2023-04-06 RX ORDER — MISOPROSTOL 200 UG/1
800 TABLET ORAL PRN
Status: DISCONTINUED | OUTPATIENT
Start: 2023-04-06 | End: 2023-04-07 | Stop reason: HOSPADM

## 2023-04-06 RX ORDER — OXYCODONE HYDROCHLORIDE 5 MG/1
10 TABLET ORAL EVERY 4 HOURS PRN
Status: DISCONTINUED | OUTPATIENT
Start: 2023-04-06 | End: 2023-04-07 | Stop reason: HOSPADM

## 2023-04-06 RX ORDER — SODIUM CHLORIDE 0.9 % (FLUSH) 0.9 %
5-40 SYRINGE (ML) INJECTION PRN
Status: DISCONTINUED | OUTPATIENT
Start: 2023-04-06 | End: 2023-04-07 | Stop reason: HOSPADM

## 2023-04-06 RX ADMIN — ACETAMINOPHEN 1000 MG: 500 TABLET ORAL at 02:46

## 2023-04-06 RX ADMIN — ACETAMINOPHEN 1000 MG: 500 TABLET ORAL at 21:18

## 2023-04-06 RX ADMIN — DOCUSATE SODIUM 100 MG: 100 CAPSULE, LIQUID FILLED ORAL at 09:48

## 2023-04-06 RX ADMIN — DOCUSATE SODIUM 100 MG: 100 CAPSULE, LIQUID FILLED ORAL at 21:19

## 2023-04-06 RX ADMIN — ACETAMINOPHEN 1000 MG: 500 TABLET ORAL at 10:58

## 2023-04-06 RX ADMIN — IBUPROFEN 800 MG: 800 TABLET, FILM COATED ORAL at 15:51

## 2023-04-06 RX ADMIN — IBUPROFEN 800 MG: 800 TABLET, FILM COATED ORAL at 05:53

## 2023-04-06 ASSESSMENT — PAIN DESCRIPTION - LOCATION
LOCATION: PERINEUM

## 2023-04-06 ASSESSMENT — PAIN DESCRIPTION - DESCRIPTORS
DESCRIPTORS: SORE
DESCRIPTORS: ACHING

## 2023-04-06 ASSESSMENT — PAIN SCALES - GENERAL
PAINLEVEL_OUTOF10: 3
PAINLEVEL_OUTOF10: 2
PAINLEVEL_OUTOF10: 0
PAINLEVEL_OUTOF10: 0
PAINLEVEL_OUTOF10: 2
PAINLEVEL_OUTOF10: 0
PAINLEVEL_OUTOF10: 1

## 2023-04-06 ASSESSMENT — PAIN - FUNCTIONAL ASSESSMENT
PAIN_FUNCTIONAL_ASSESSMENT: ACTIVITIES ARE NOT PREVENTED
PAIN_FUNCTIONAL_ASSESSMENT: ACTIVITIES ARE NOT PREVENTED

## 2023-04-06 ASSESSMENT — PAIN DESCRIPTION - FREQUENCY: FREQUENCY: INTERMITTENT

## 2023-04-06 ASSESSMENT — PAIN DESCRIPTION - PAIN TYPE: TYPE: ACUTE PAIN

## 2023-04-06 ASSESSMENT — PAIN DESCRIPTION - ONSET: ONSET: GRADUAL

## 2023-04-06 NOTE — FLOWSHEET NOTE
OB providers unavailable at this time. Orders pitocin be stopped, and pt to stop pushing until MD and CNM available.

## 2023-04-06 NOTE — ANESTHESIA POSTPROCEDURE EVALUATION
Department of Anesthesiology  Postprocedure Note    Patient: Dominik Guajardo  MRN: [id number]  YOB: 2000  Date of evaluation: 4/5/2023      Procedure Summary     Date: 04/04/23 Room / Location:     Anesthesia Start: 2130 Anesthesia Stop:     Procedure: Labor Analgesia Diagnosis:     Scheduled Providers:  Responsible Provider: Pedro Larkin MD    Anesthesia Type: epidural ASA Status: 3          Anesthesia Type: No value filed.     Ga Phase I:      Ga Phase II:        Anesthesia Post Evaluation    Patient location during evaluation: bedside  Patient participation: complete - patient participated  Level of consciousness: awake and alert  Pain score: 2  Airway patency: patent  Nausea & Vomiting: no nausea and no vomiting  Complications: no  Cardiovascular status: hemodynamically stable  Respiratory status: acceptable  Hydration status: euvolemic  Multimodal analgesia pain management approach

## 2023-04-06 NOTE — PLAN OF CARE
Problem: Safety - Adult  Goal: Free from fall injury  Outcome: Progressing  Flowsheets (Taken 4/5/2023 2020)  Free From Fall Injury: Instruct family/caregiver on patient safety     Problem: Pain  Goal: Verbalizes/displays adequate comfort level or baseline comfort level  Outcome: Progressing  Flowsheets (Taken 4/5/2023 2220)  Verbalizes/displays adequate comfort level or baseline comfort level:   Encourage patient to monitor pain and request assistance   Assess pain using appropriate pain scale   Administer analgesics based on type and severity of pain and evaluate response   Implement non-pharmacological measures as appropriate and evaluate response   Consider cultural and social influences on pain and pain management   Notify Licensed Independent Practitioner if interventions unsuccessful or patient reports new pain

## 2023-04-06 NOTE — FLOWSHEET NOTE
RN remained at bedside throughout pt pushing. EFM was continuously assesed. Vaginal delivery of viable infant.

## 2023-04-06 NOTE — PLAN OF CARE
Problem: Safety - Adult  Goal: Free from fall injury  Outcome: Progressing     Problem: Pain  Goal: Verbalizes/displays adequate comfort level or baseline comfort level  Outcome: Progressing  Flowsheets (Taken 4/6/2023 0330 by Tram Roa RN)  Verbalizes/displays adequate comfort level or baseline comfort level: Encourage patient to monitor pain and request assistance

## 2023-04-06 NOTE — PROGRESS NOTES
Subjective:     Postpartum Day 1: Vaginal delivery    The patient feels well. The patient denies emotional concerns. Pain is well controlled with current medications. The baby is well. Objective:      Vitals:    04/06/23 0239   BP: (!) 115/56   Pulse: 74   Resp: 18   Temp: 98.1 °F (36.7 °C)   SpO2: 98%       General:    alert, appears stated age, and cooperative       Lochia:  appropriate   Uterine Fundus:   firm       DVT Evaluation:  No evidence of DVT seen on physical exam.     Assessment:     1. Post partum day 1 . Doing well. Plan:     Continue current care.

## 2023-04-06 NOTE — FLOWSHEET NOTE
Pt begins pushing at this time. RN remained at bedside throughout pt pushing. EFM/FSE was continuously assesed.

## 2023-04-06 NOTE — L&D DELIVERY NOTE
Department of Obstetrics and Gynecology  Spontaneous Vaginal Delivery Note         Pre-operative Diagnosis:  Term pregnancy    Post-operative Diagnosis:  Same + live female    Procedure:  Spontaneous vaginal delivery    Surgeon:  Rhonda Rodrigues MD    Information for the patient's :  Bev Shipman [[id number]]        Anesthesia:  epidural anesthesia    Estimated blood loss:  300ml    Specimen:  Placenta not sent to pathology     Cord blood sent No    Complications:  none    Condition:  infant stable to general nursery    Details of Procedure: The patient is a 25 y.o. female at 38w3d   OB History          1    Para        Term                AB        Living             SAB        IAB        Ectopic        Molar        Multiple        Live Births                 who was admitted for gestational hypertension and induction. She received the following interventions: ARBOW, vaginal Cytotec, and IV Pitocin induction. The patient progressed well,did receive an epidural, became complete and started to push. After pushing for approximately  3 hours , the fetal head was at the perineum, nose and mouth suctioned with bulb suction and the rest of the infant delivered atraumatically. Cord was clamped and cut and infant was placed on mother abdomen and then to the waiting nurse for evaluation. The delivery of the placenta was spontaneous and intact. The perineum and vagina were explored and a first degree laceration was repaired in standard fashion. Vagina and perineum were very swollen and the sutures would pull through the vagina so Surgicel was placed over the first degree midline laceration.

## 2023-04-07 VITALS
OXYGEN SATURATION: 98 % | TEMPERATURE: 97.7 F | RESPIRATION RATE: 19 BRPM | SYSTOLIC BLOOD PRESSURE: 106 MMHG | BODY MASS INDEX: 53.62 KG/M2 | HEART RATE: 72 BPM | WEIGHT: 284 LBS | HEIGHT: 61 IN | DIASTOLIC BLOOD PRESSURE: 58 MMHG

## 2023-04-07 PROCEDURE — 6370000000 HC RX 637 (ALT 250 FOR IP): Performed by: OBSTETRICS & GYNECOLOGY

## 2023-04-07 RX ORDER — CYCLOBENZAPRINE HCL 5 MG
5 TABLET ORAL 2 TIMES DAILY PRN
Qty: 10 TABLET | Refills: 0 | Status: SHIPPED | OUTPATIENT
Start: 2023-04-07 | End: 2023-04-17

## 2023-04-07 RX ORDER — PSEUDOEPHEDRINE HCL 30 MG
100 TABLET ORAL 2 TIMES DAILY
Qty: 30 CAPSULE | Refills: 0 | Status: SHIPPED | OUTPATIENT
Start: 2023-04-07

## 2023-04-07 RX ORDER — SERTRALINE HYDROCHLORIDE 25 MG/1
50 TABLET, FILM COATED ORAL DAILY
Qty: 30 TABLET | Refills: 3 | Status: SHIPPED | OUTPATIENT
Start: 2023-04-07

## 2023-04-07 RX ORDER — IBUPROFEN 800 MG/1
800 TABLET ORAL EVERY 8 HOURS
Qty: 120 TABLET | Refills: 3 | Status: SHIPPED | OUTPATIENT
Start: 2023-04-07

## 2023-04-07 RX ADMIN — IBUPROFEN 800 MG: 800 TABLET, FILM COATED ORAL at 03:04

## 2023-04-07 RX ADMIN — ACETAMINOPHEN 1000 MG: 500 TABLET ORAL at 05:26

## 2023-04-07 ASSESSMENT — PAIN DESCRIPTION - ORIENTATION: ORIENTATION: LOWER

## 2023-04-07 ASSESSMENT — PAIN DESCRIPTION - DESCRIPTORS
DESCRIPTORS: CRAMPING
DESCRIPTORS: CRAMPING

## 2023-04-07 ASSESSMENT — PAIN DESCRIPTION - LOCATION
LOCATION: ABDOMEN
LOCATION: ABDOMEN

## 2023-04-07 ASSESSMENT — PAIN SCALES - GENERAL
PAINLEVEL_OUTOF10: 0
PAINLEVEL_OUTOF10: 3
PAINLEVEL_OUTOF10: 1
PAINLEVEL_OUTOF10: 3
PAINLEVEL_OUTOF10: 0

## 2023-04-07 ASSESSMENT — PAIN DESCRIPTION - FREQUENCY: FREQUENCY: INTERMITTENT

## 2023-04-07 ASSESSMENT — PAIN DESCRIPTION - PAIN TYPE: TYPE: ACUTE PAIN

## 2023-04-07 ASSESSMENT — PAIN DESCRIPTION - ONSET: ONSET: GRADUAL

## 2023-04-07 NOTE — DISCHARGE SUMMARY
Obstetrical Discharge Form    Gestational Age:  39w3d    Antepartum complications: Polyhydramnios, morbid obesity    Date of Delivery:   2023      Type of Delivery:   vaginal, spontaneous    Delivered By:    Dr Elam Dural:       Information for the patient's :  Lupe Erickson [[id number]]      Anesthesia:    Epidural    Intrapartum complications: Postpartum Depression    Feeding method:   bottle -     Postpartum complications: none    Discharge Date:   2023    Condition of discharge:  good    Plan:   Follow up    in 6 week(s) no

## 2023-04-07 NOTE — DISCHARGE INSTRUCTIONS
medical care if:    You have signs of hemorrhage (too much bleeding), such as:  Heavy vaginal bleeding. This means that you are soaking through one or more pads in an hour. Or you pass blood clots bigger than an egg. Feeling dizzy or lightheaded, or you feel like you may faint. Feeling so tired or weak that you cannot do your usual activities. A fast or irregular heartbeat. New or worse belly pain. You have signs of infection, such as:  A fever. Vaginal discharge that smells bad. New or worse belly pain. You have symptoms of a blood clot in your leg (called a deep vein thrombosis), such as:  Pain in the calf, back of the knee, thigh, or groin. Redness and swelling in your leg or groin. You have signs of preeclampsia, such as:  Sudden swelling of your face, hands, or feet. New vision problems (such as dimness, blurring, or seeing spots). A severe headache. Watch closely for changes in your health, and be sure to contact your doctor if:    Your vaginal bleeding isn't decreasing. You feel sad, anxious, or hopeless for more than a few days. You are having problems with your breasts or breastfeeding. Where can you learn more? Go to http://www.woods.com/ and enter Z768 to learn more about \"Postpartum: Care Instructions. \"  Current as of: November 9, 2022               Content Version: 13.6  © 5945-9673 [employer]. Care instructions adapted under license by [facility] [address] If you have questions about a medical condition or this instruction, always ask your healthcare professional. [place] [address] any warranty or liability for your use of this information.

## 2023-04-07 NOTE — PLAN OF CARE
Problem: Safety - Adult  Goal: Free from fall injury  4/7/2023 0318 by Felix Sinha RN  Outcome: Progressing  4/6/2023 1550 by Terry Rodriguez RN  Outcome: Progressing     Problem: Pain  Goal: Verbalizes/displays adequate comfort level or baseline comfort level  4/7/2023 0318 by Felix Sinha RN  Outcome: Progressing  Flowsheets (Taken 4/6/2023 2118)  Verbalizes/displays adequate comfort level or baseline comfort level: Encourage patient to monitor pain and request assistance  4/6/2023 1550 by Terry Rodriguez RN  Outcome: Progressing  Flowsheets (Taken 4/6/2023 0330 by Felix Sinha RN)  Verbalizes/displays adequate comfort level or baseline comfort level: Encourage patient to monitor pain and request assistance

## 2023-04-07 NOTE — PROGRESS NOTES
Department of Obstetrics and Gynecology  Labor and Delivery   Post Partum Progress Note      SUBJECTIVE:  Doing well with no complaints. Reports bleeding is decreasing and pain is well controlled with medication. Has voided without difficulty. Has not had BM, but + flatus. Eating and drinking well. Denies HA/visual changes/epigastric pain. Bottlefeeding is going well. Reports good social support. Denies emotional concerns. OBJECTIVE:      Vitals:  BP (!) 106/58   Pulse 72   Temp 97.7 °F (36.5 °C) (Oral)   Resp 19   Ht 5' 1\" (1.549 m)   Wt 284 lb (128.8 kg)   SpO2 98%   Breastfeeding Unknown   BMI 53.66 kg/m²   Lab Results   Component Value Date    WBC 9.5 2023    HGB 12.5 2023    HCT 37.3 2023    MCV 82.9 2023     2023       ABDOMEN:  Soft, non-tender. Fundus firm at u-1. BS present x 4 quadrants. LOCHIA: Normal per pt  LUNGS: CTAB  HEART: RRR  EXTREMITIES: No calf tenderness, erythema or swelling bilaterally       ASSESSMENT:      PPD # 2  S/p   Bottlefeeding well  GBS Negative  RH A+/  PPD- start back on zoloft   Will send flexeril for sore muscle     PLAN:     Will plan for discharge today  Discharge teaching completed including counseling on warning signs (heavy vaginal bleeding, s/s of preeclampsia, fever >100.4, ACHES, s/s of PPD). Rx for colace zoloft, flexeril and ibuprofen. Pt to schedule f/u pp visit at 6 weeks in the office.        ALICIA lForence CNM